# Patient Record
Sex: MALE | Race: ASIAN | NOT HISPANIC OR LATINO | Employment: OTHER
[De-identification: names, ages, dates, MRNs, and addresses within clinical notes are randomized per-mention and may not be internally consistent; named-entity substitution may affect disease eponyms.]

---

## 2017-09-17 ENCOUNTER — HEALTH MAINTENANCE LETTER (OUTPATIENT)
Age: 72
End: 2017-09-17

## 2019-01-09 ENCOUNTER — DOCUMENTATION ONLY (OUTPATIENT)
Dept: OPHTHALMOLOGY | Facility: CLINIC | Age: 74
End: 2019-01-09

## 2020-02-23 ENCOUNTER — HEALTH MAINTENANCE LETTER (OUTPATIENT)
Age: 75
End: 2020-02-23

## 2022-10-05 ENCOUNTER — OFFICE VISIT (OUTPATIENT)
Dept: OPHTHALMOLOGY | Facility: CLINIC | Age: 77
End: 2022-10-05
Payer: MEDICARE

## 2022-10-05 DIAGNOSIS — H43.812 POSTERIOR VITREOUS DETACHMENT OF LEFT EYE: ICD-10-CM

## 2022-10-05 DIAGNOSIS — H35.3230 BILATERAL EXUDATIVE AGE-RELATED MACULAR DEGENERATION, UNSPECIFIED STAGE (H): ICD-10-CM

## 2022-10-05 DIAGNOSIS — H40.053 OCULAR HYPERTENSION, BILATERAL: ICD-10-CM

## 2022-10-05 DIAGNOSIS — Z01.01 ENCOUNTER FOR EXAMINATION OF EYES AND VISION WITH ABNORMAL FINDINGS: ICD-10-CM

## 2022-10-05 DIAGNOSIS — H26.492 POSTERIOR CAPSULAR OPACIFICATION VISUALLY SIGNIFICANT OF LEFT EYE: ICD-10-CM

## 2022-10-05 DIAGNOSIS — H52.4 PRESBYOPIA: ICD-10-CM

## 2022-10-05 DIAGNOSIS — Z96.1 PSEUDOPHAKIA: ICD-10-CM

## 2022-10-05 DIAGNOSIS — E11.3299 DIABETES MELLITUS WITH BACKGROUND RETINOPATHY (H): Primary | ICD-10-CM

## 2022-10-05 PROCEDURE — 66821 AFTER CATARACT LASER SURGERY: CPT | Mod: LT | Performed by: OPHTHALMOLOGY

## 2022-10-05 PROCEDURE — 92015 DETERMINE REFRACTIVE STATE: CPT | Performed by: OPHTHALMOLOGY

## 2022-10-05 PROCEDURE — 92004 COMPRE OPH EXAM NEW PT 1/>: CPT | Mod: 57 | Performed by: OPHTHALMOLOGY

## 2022-10-05 RX ORDER — BUDESONIDE AND FORMOTEROL FUMARATE DIHYDRATE 80; 4.5 UG/1; UG/1
2 AEROSOL RESPIRATORY (INHALATION)
COMMUNITY
Start: 2022-02-14

## 2022-10-05 RX ORDER — TRIAMCINOLONE ACETONIDE 5 MG/G
CREAM TOPICAL
COMMUNITY
Start: 2021-11-04

## 2022-10-05 RX ORDER — FLASH GLUCOSE SCANNING READER
EACH MISCELLANEOUS
COMMUNITY
Start: 2022-04-04

## 2022-10-05 RX ORDER — DAPAGLIFLOZIN 10 MG/1
10 TABLET, FILM COATED ORAL
COMMUNITY
Start: 2022-07-28

## 2022-10-05 RX ORDER — LINAGLIPTIN 5 MG/1
5 TABLET, FILM COATED ORAL
COMMUNITY
Start: 2022-06-01

## 2022-10-05 ASSESSMENT — REFRACTION_MANIFEST
OS_AXIS: 006
OD_ADD: +3.25
OS_ADD: +3.25
OS_CYLINDER: +2.50
OD_AXIS: 015
OD_CYLINDER: +2.50
OD_SPHERE: -2.00
OS_SPHERE: -4.00

## 2022-10-05 ASSESSMENT — CONF VISUAL FIELD
OS_SUPERIOR_NASAL_RESTRICTION: 2
OS_INFERIOR_NASAL_RESTRICTION: 2
OD_SUPERIOR_TEMPORAL_RESTRICTION: 2
OD_INFERIOR_TEMPORAL_RESTRICTION: 2
OD_INFERIOR_NASAL_RESTRICTION: 2
OD_SUPERIOR_NASAL_RESTRICTION: 1
OS_INFERIOR_TEMPORAL_RESTRICTION: 2
OS_SUPERIOR_TEMPORAL_RESTRICTION: 2

## 2022-10-05 ASSESSMENT — CUP TO DISC RATIO
OS_RATIO: 0.5
OD_RATIO: 0.5

## 2022-10-05 ASSESSMENT — REFRACTION_WEARINGRX
OS_CYLINDER: SPHERE
OS_SPHERE: -1.25
OD_SPHERE: -1.00
SPECS_TYPE: BIFOCAL
OD_CYLINDER: SPHERE

## 2022-10-05 ASSESSMENT — EXTERNAL EXAM - RIGHT EYE: OD_EXAM: PROLAPSED FAT PADS: UPPER, LOWER

## 2022-10-05 ASSESSMENT — TONOMETRY
OS_IOP_MMHG: 26
OD_IOP_MMHG: 21
IOP_METHOD: APPLANATION

## 2022-10-05 ASSESSMENT — VISUAL ACUITY
OS_CC: 20/100
OS_PH_CC: 20/80
METHOD: SNELLEN - LINEAR
CORRECTION_TYPE: GLASSES
OD_CC: CF@2

## 2022-10-05 ASSESSMENT — SLIT LAMP EXAM - LIDS
COMMENTS: HIGH CREASE, 1+ PTOSIS
COMMENTS: HIGH CREASE, 1+ PTOSIS

## 2022-10-05 ASSESSMENT — EXTERNAL EXAM - LEFT EYE: OS_EXAM: PROLAPSED FAT PADS: UPPER, LOWER

## 2022-10-05 NOTE — LETTER
10/5/2022         RE: Era Saunders  [address]  [place] [address]        Dear Colleague,    Thank you for referring your patient, Era Saunders, to the [facility]. Please see a copy of my visit note below.     Current Eye Medications:  none     Subjective:  Here for complete eye exam today. Diabetes, more than 7 wife thinks. Seeing Dr. Wesley for injections in left eye every three months for AMD. Just had shot left eye last week.    Hemoglobin A1C   Date Value Ref Range Status   03/09/2015 7.0 (H) 4.3 - 6.0 % Final        Objective:  See Ophthalmology Exam.       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  Visually significant posterior capsule opacity left eye.  Stable background diabetic retinopathy both eyes.      ICD-10-CM    1. Diabetes mellitus with background retinopathy (H)  E11.3299 EYE EXAM (SIMPLE-NONBILLABLE)      2. Bilateral exudative age-related macular degeneration, unspecified stage (H)  H35.3230       3. Pseudophakia, ou; Yag Caps, od  Z96.1       4. Posterior capsular opacification visually significant of left eye  H26.492       5. Ocular hypertension, bilateral  H40.053       6. Posterior vitreous detachment of left eye  H43.812       7. Encounter for examination of eyes and vision with abnormal findings  Z01.01       8. Presbyopia  H52.4 REFRACTIVE STATUS           Plan: Yag Capsulotomy performed without problems left eye under Proparacaine anesthetic.  Well tolerated.  Number of applications: 25  Power of applications: 1.3 mJ  Iopidine given.    Aureliano Oneal M.D.                Again, thank you for allowing me to participate in the care of your patient.        Sincerely,        Aureliano Oneal MD

## 2022-10-05 NOTE — PATIENT INSTRUCTIONS
Your eye may be slightly red, sore, and blurry for a few days.  You may notice some floaters for a few days.  Make a return visit for a intraocular pressure check and refraction in about one - three weeks.    Aureliano Oneal M.D.  [phone]

## 2022-10-05 NOTE — PROGRESS NOTES
Current Eye Medications:  none     Subjective:  Here for complete eye exam today. Diabetes, more than 7 wife thinks. Seeing Dr. Wesley for injections in left eye every three months for AMD. Just had shot left eye last week.    Hemoglobin A1C   Date Value Ref Range Status   03/09/2015 7.0 (H) 4.3 - 6.0 % Final        Objective:  See Ophthalmology Exam.       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  Visually significant posterior capsule opacity left eye.  Stable background diabetic retinopathy both eyes.      ICD-10-CM    1. Diabetes mellitus with background retinopathy (H)  E11.3299 EYE EXAM (SIMPLE-NONBILLABLE)      2. Bilateral exudative age-related macular degeneration, unspecified stage (H)  H35.3230       3. Pseudophakia, ou; Yag Caps, od  Z96.1       4. Posterior capsular opacification visually significant of left eye  H26.492       5. Ocular hypertension, bilateral  H40.053       6. Posterior vitreous detachment of left eye  H43.812       7. Encounter for examination of eyes and vision with abnormal findings  Z01.01       8. Presbyopia  H52.4 REFRACTIVE STATUS           Plan: Yag Capsulotomy performed without problems left eye under Proparacaine anesthetic.  Well tolerated.  Number of applications: 25  Power of applications: 1.3 mJ  Iopidine given.    Aureliano Oneal M.D.

## 2022-10-20 ENCOUNTER — DOCUMENTATION ONLY (OUTPATIENT)
Dept: OPHTHALMOLOGY | Facility: CLINIC | Age: 77
End: 2022-10-20

## 2022-10-26 NOTE — PATIENT INSTRUCTIONS
"Glasses prescription given - optional  May use artificial tears up to four times a day (like Refresh Optive, Systane Balance, TheraTears, or generic artificial tears are ok. Avoid \"get the red out\" drops).   Possible posterior vitreous detachment (sudden onset large floater and/or flashing lights) right eye discussed.   Continue care with Dr. Wesley.  Return visit 2 months for an intraocular pressure check, glaucoma OCT, retinal OCT, and Hall Visual Field.   Aureliano Oneal M.D.  [phone]     Patient Education   Diabetes weakens the blood vessels all over the body, including the eyes. Damage to the blood vessels in the eyes can cause swelling or bleeding into part of the eye (called the retina). This is called diabetic retinopathy (ZAHRA-tin--pu-thee). If not treated, this disease can cause vision loss or blindness.   Symptoms may include blurred or distorted vision, but many people have no symptoms. It's important to see your eye doctor regularly to check for problems.   Early treatment and good control can help protect your vision. Here are the things you can do to help prevent vision loss:      1. Keep your blood sugar levels under tight control.      2. Bring high blood pressure under control.      3. No smoking.      4. Have yearly dilated eye exams.       "

## 2022-10-27 ENCOUNTER — OFFICE VISIT (OUTPATIENT)
Dept: OPHTHALMOLOGY | Facility: CLINIC | Age: 77
End: 2022-10-27
Payer: COMMERCIAL

## 2022-10-27 DIAGNOSIS — Z96.1 PSEUDOPHAKIA: Primary | ICD-10-CM

## 2022-10-27 PROCEDURE — 99024 POSTOP FOLLOW-UP VISIT: CPT | Performed by: OPHTHALMOLOGY

## 2022-10-27 ASSESSMENT — VISUAL ACUITY
CORRECTION_TYPE: GLASSES
OD_CC: LP
OS_CC: 20/50
OS_CC+: -2
METHOD: SNELLEN - LINEAR

## 2022-10-27 ASSESSMENT — EXTERNAL EXAM - RIGHT EYE: OD_EXAM: PROLAPSED FAT PADS: UPPER, LOWER

## 2022-10-27 ASSESSMENT — REFRACTION_MANIFEST
OS_AXIS: 180
OS_ADD: +3.00
OS_SPHERE: -2.50
OS_CYLINDER: +0.75

## 2022-10-27 ASSESSMENT — REFRACTION_WEARINGRX
SPECS_TYPE: BIFOCAL
OS_CYLINDER: SPHERE
OS_SPHERE: -1.25
OD_CYLINDER: SPHERE
OD_SPHERE: -1.00

## 2022-10-27 ASSESSMENT — TONOMETRY
OS_IOP_MMHG: 22
IOP_METHOD: APPLANATION

## 2022-10-27 ASSESSMENT — EXTERNAL EXAM - LEFT EYE: OS_EXAM: PROLAPSED FAT PADS: UPPER, LOWER

## 2022-10-27 ASSESSMENT — SLIT LAMP EXAM - LIDS
COMMENTS: HIGH CREASE, 1+ PTOSIS
COMMENTS: HIGH CREASE, 1+ PTOSIS

## 2022-10-27 NOTE — PROGRESS NOTES
" Current Eye Medications:  None.     Subjective:  Status/Post Yag Capsulotomy left eye:  10-5-22.   Vision has remained about the same since the laser.     Objective:  See Ophthalmology Exam.       Assessment:  Doing well s/p Yag Caps left eye.      Plan:  Glasses prescription given - optional  May use artificial tears up to four times a day (like Refresh Optive, Systane Balance, TheraTears, or generic artificial tears are ok. Avoid \"get the red out\" drops).   Possible posterior vitreous detachment (sudden onset large floater and/or flashing lights) right eye discussed.   Continue care with Dr. Wesley.  Return visit 2 months for an intraocular pressure check, glaucoma OCT, retinal OCT, and Hall Visual Field.   Aureliano Oneal M.D.  [phone]      "

## 2022-10-27 NOTE — LETTER
"    10/27/2022         RE: Era Saunders  [address]  [place] [address]        Dear Colleague,    Thank you for referring your patient, Era Saunders, to the [facility]. Please see a copy of my visit note below.     Current Eye Medications:  None.     Subjective:  Status/Post Yag Capsulotomy left eye:  10-5-22.   Vision has remained about the same since the laser.     Objective:  See Ophthalmology Exam.       Assessment:  Doing well s/p Yag Caps left eye.      Plan:  Glasses prescription given - optional  May use artificial tears up to four times a day (like Refresh Optive, Systane Balance, TheraTears, or generic artificial tears are ok. Avoid \"get the red out\" drops).   Possible posterior vitreous detachment (sudden onset large floater and/or flashing lights) right eye discussed.   Continue care with Dr. Wesley.  Return visit 2 months for an intraocular pressure check, glaucoma OCT, retinal OCT, and Hall Visual Field.   Aureliano Oneal M.D.  [phone]          Again, thank you for allowing me to participate in the care of your patient.        Sincerely,        Aureliano Oneal MD    "

## 2022-11-11 ENCOUNTER — DOCUMENTATION ONLY (OUTPATIENT)
Dept: OPHTHALMOLOGY | Facility: CLINIC | Age: 77
End: 2022-11-11

## 2023-03-20 ENCOUNTER — DOCUMENTATION ONLY (OUTPATIENT)
Dept: OPHTHALMOLOGY | Facility: CLINIC | Age: 78
End: 2023-03-20
Payer: MEDICARE

## 2023-12-14 ENCOUNTER — TRANSFERRED RECORDS (OUTPATIENT)
Dept: HEALTH INFORMATION MANAGEMENT | Facility: CLINIC | Age: 78
End: 2023-12-14

## 2023-12-14 LAB — RETINOPATHY: POSITIVE

## 2024-06-06 ENCOUNTER — TRANSFERRED RECORDS (OUTPATIENT)
Dept: HEALTH INFORMATION MANAGEMENT | Facility: CLINIC | Age: 79
End: 2024-06-06

## 2024-06-06 LAB — RETINOPATHY: POSITIVE

## 2024-09-12 NOTE — PROGRESS NOTES
[facility] New Visit Note    Chief Complaint: memory problems    History of Present Illness:  Mr. Saunders is a 79 year old right-handed male presenting for evaluation of memory problems. He is accompanied to today's visit by his wife Shon (a retired physician, internal medicine) and daughter Phillip who assist in providing the history.    Mr. Saunders was able to provide much history. Wife and daughter reported that he started having some memory problems for a while but his symptoms become more noticeable during COVID. They described as increased confusion and recalling his friends or some of his family members. He was diagnosed of dementia/Alzheimer's disease by [facility]. He had CT head done in 2021 which reported b/l hippocampal volume loss and SVIC. He has been taking mementine at 10mg BID and sertraline 50mg daily.     Family reports some functional decline in past 5-6 months or so, mostly with nighttime snacking. He repetitively asks for food and sneaks to the kitchen to eat snacks at night when everyone goes to bed. His blood sugar was found high in the morning. He also needs a lot of prompting to do his own ADLs. He does not like to shower. They now have a PCA in house every day spending 3-4 hours/day with him when they go out for walk or have lunch together.     Denies any symptoms of delusions/paranoia, visual/auditory hallucinations. Sleep is good with average 7-8 hours/night and sometimes he takes naps during the day. There are some issues with his balance and he had one fall yesterday and also tripped and fell 3-4 months ago.    Wife has been sorting his medications all these years. However in the past 9 months, they noticed that he took wrong pills from his pill box like repetitively taking his medications with one incident when his BS was low. Family is interested in understanding the disease trajectory and how to better manage his behavior symptom like excessive eating and  snacking and how to help him better.    ROS:  General: no weight loss or fever  Cardiac: no chest pain or palpitations  Pulmonary: no SOB or cough  GI: no abdominal pain, nausea, vomiting, or constipation  : no urinary urgency, pain or incontinence  Musculoskeletal: no joint pain or stiffness  Skin: no rashes or easy bruising  Neurological: as above  Pain Evaluation: no reported pain today.    Patient Active Problem List   Diagnosis    Hyperlipidemia LDL goal <100    TYPE 2 DIABETES, HBA1C GOAL < 7%    Left inguinal hernia    Diabetes mellitus with background retinopathy (H)    Tendonitis, Achilles, right    Pseudophakia, Yag Caps, ou    Hypertension goal BP (blood pressure) < 140/90    AD (atopic dermatitis)    Hyperkeratosis    Lichen simplex chronicus    Essential hypertension, benign    Hypertrophy of prostate with urinary obstruction    Cataract, mild-mod, os    Macular degeneration, wet, active antiVEGF, ou    Ocular hypertension, bilateral       Past Medical History:   Diagnosis Date    Diabetic retinopathy (H)     Eczema     Essential hypertension, benign     Glaucoma     Hyperlipidemia LDL goal <100     Macular degeneration     Nonsenile cataract     Type II or unspecified type diabetes mellitus without mention of complication, not stated as uncontrolled        Past Surgical History:   Procedure Laterality Date    CATARACT IOL, RT/LT      LASER YAG CAPSULOTOMY  11/01/2013    right eye    LASER YAG CAPSULOTOMY Left 10/05/2022    PHACOEMULSIFICATION WITH STANDARD INTRAOCULAR LENS IMPLANT  08/01/2011    right eye    PHACOEMULSIFICATION WITH STANDARD INTRAOCULAR LENS IMPLANT Left     VITRECTOMY PARSPLANA  06/01/2009    right eye - submacular hemorrhage evacuation    ZZC STOMACH SURGERY PROCEDURE UNLISTED         Current Outpatient Medications   Medication Sig Dispense Refill    ASPIRIN LOW DOSE 81 MG EC tablet TAKE 1 TABLET (81 MG) BY MOUTH DAILY 100 tablet 0    budesonide-formoterol (SYMBICORT) 80-4.5  MCG/ACT Inhaler Inhale 2 puffs into the lungs      cholecalciferol 50 MCG (2000 UT) tablet Take 50 mcg by mouth      Continuous Blood Gluc  (FREESTYLE TUTU 14 DAY READER) NOE CHANGE SENSOR EVERY 14 DAYS      dapagliflozin (FARXIGA) 10 MG TABS tablet Take 10 mg by mouth      docusate sodium 100 MG tablet Take 100 mg by mouth 2 times daily as needed for constipation 60 tablet 1    FISH OIL 1000 MG OR CAPS None Entered      glipiZIDE (GLUCOTROL) 10 MG tablet TAKE 1 TABLET (10 MG) BY MOUTH 2 TIMES DAILY (BEFORE MEALS) FOR DIABETES. 180 tablet 0    glucose blood VI test strips (ACCU-CHEK COMPACT STRIPS) strip 1 strip by Strip route daily 100 each 99    linagliptin (TRADJENTA) 5 MG TABS tablet Take 5 mg by mouth      metFORMIN (GLUCOPHAGE) 1000 MG tablet Take 1,000 mg by mouth      metFORMIN (GLUCOPHAGE) 1000 MG tablet Take 1 tablet (1,000 mg) by mouth 2 times daily (with meals) for diabetes. 180 tablet 2    ORDER FOR DME Glucometer covered by insurance (Breeze 2). 1 each 0    sertraline (ZOLOFT) 50 MG tablet Take 50 mg by mouth      Skin Protectants, Misc. (EUCERIN) cream Apply  topically as needed for dry skin. 454 g prn    SOFTCLIX LANCETS MISC 1 Device by Device route daily 100 each 99    triamcinolone (ARISTOCORT HP) 0.5 % external cream APPLY 1 APPLICATION TO SKIN TWICE A DAY.      Urea (CARMOL 40) 40 % CREA To areas of thickened skin of the feet twice daily. 85.05 g 3        Allergies   Allergen Reactions    Sulfa Antibiotics Hives, Itching and Rash     Other reaction(s): *Unknown  Does not remember reaction  Does not remember reaction         Family History   Problem Relation Age of Onset    Cataracts Mother     Cataracts Father     Heart Disease Brother    Mother with dementia (at age of 70s); brother with LOC/Alzheimer's disease at age of 50s)        Social History     Socioeconomic History    Marital status:      Spouse name: Not on file    Number of children: Not on file    Years of education:  Not on file    Highest education level: Not on file   Occupational History     Employer: RETIRED   Tobacco Use    Smoking status: Never    Smokeless tobacco: Never    Tobacco comments:     9/24/08   Substance and Sexual Activity    Alcohol use: No    Drug use: No    Sexual activity: Yes     Partners: Female     Birth control/protection: Surgical   Other Topics Concern    Parent/sibling w/ CABG, MI or angioplasty before 65F 55M? No     Service No    Blood Transfusions No    Caffeine Concern No    Occupational Exposure No    Hobby Hazards No    Sleep Concern No    Stress Concern No    Weight Concern No    Special Diet No    Back Care No    Exercise No    Bike Helmet No    Seat Belt Yes    Self-Exams No   Social History Narrative    Not on file     Social Determinants of Health     Financial Resource Strain: Not on file   Food Insecurity: Not on file   Transportation Needs: Not on file   Physical Activity: Not on file   Stress: Not on file   Social Connections: Not on file   Interpersonal Safety: Not on file   Housing Stability: Not on file     General Physical examination:  /67 (BP Location: Right arm, Patient Position: Sitting, Cuff Size: Adult Regular)   Pulse 93   Temp 97.7  F (36.5  C) (Temporal)   SpO2 97%      Neurological examination:  Mental status: Mr. Saunders  is awake and alert. He is attentive with no apraxia noted. Follows cross-body commands.  Cranial nerves:  Visual fields are full to confrontation with no visual extinction. Extraocular movements are intact. Smooth pursuit is intact. Saccades are normal in initiation, velocity and amplitude both vertically and horizontally. Facial strength is full bilaterally.    Motor examination: Bulk is normal throughout. Axial and upper and lower extremity tone is normal. No pronator drift is noted. Strength is 5/5 and symmetric throughout. No bradykinesia or cogwheel rigidity.  Sensation: LT intact b/l throughout.  Reflexes: 1+  throughout.  Coordination: Finger-nose-finger is normal with no dysmetria bilaterally. Negative Romberg's sign.  Gait: He was able to stand from sitting position without arm support. Negative Retropulsion.    MoCA /30 - deferred with dementia stage  /5 visuospatial/Executive  /3 naming  /6 attention  /3 language  /2 abstract  /5 delayed recall  /6 orientation    Neuropsychological evaluation:  N/A.    Labs:  Lab Results   Component Value Date    WBC 9.1 08/31/2012    RBC 4.66 08/31/2012    HGB 13.6 08/31/2012    HCT 40.7 08/31/2012    MCV 87 08/31/2012    MCH 29.1 08/31/2012    MCHC 33.3 08/31/2012    RDW 12.9 08/31/2012     08/31/2012     03/09/2015    POTASSIUM 3.7 03/09/2015    CHLORIDE 99 03/09/2015    CO2 27 03/09/2015    ANIONGAP 12 03/09/2015     (H) 03/09/2015    BUN 19 03/09/2015    CR 1.13 03/09/2015    FADI 9.2 03/09/2015    TSH 3.08 08/19/2014      Imaging:  Results for orders placed or performed in visit on 03/09/15   XR Cervical Spine 2/3 Views    Narrative    XR CERVICAL SPINE 2/3 VWS 3/9/2015 12:39 PM    HISTORY: Pain.    COMPARISON: None.      Impression    IMPRESSION: No evidence of acute fracture. There is straightening of  the cervical spine with moderate to severe degenerative changes of  C5-C6. The prevertebral soft tissues are unremarkable.    ZUNILDA PATTERSON MD      CT Head w/o Contrast  3/28/2021  Impression    IMPRESSION:  1.  No acute abnormality.  2.  Involutional changes, most pronounced involving the anterior temporal lobes, including the hippocampal formations.  3.  Nonspecific supratentorial white matter low-attenuation, most likely representing foci of chronic microvascular infarction.     3/30/2022  FINDINGS: Fairly advanced cerebral volume loss, as before. Hippocampal atrophy on both sides, right more than left. No hydrocephalus.     No intracranial hemorrhage. No mass or evidence of acute ischemic stroke.     Mild degree of vague low density in the deep cerebral  white matter, typical for age, unchanged.     No extra-axial fluid collection.     No skull fracture.     Impression:  Mr. Saunders is a 79 year old right-handed male who presents with memory problems for 3+ years. Now with increased confusion and prompting for ADLs. Some behavior symptoms described as excessive eating and snacking. Exam today did not observe any focal or lateralizing symptoms or any evidence of Parkinsonian features. CT head results reviewed. Clinical presentation is suggestive of major neurocognitive disorder, moderate dementia stage with possible Alzheimer's disease as primary etiology. 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    Recommendations:  1. Continue on mementine at 10mg BID.  2. Continue on zoloft at 50mg daily.  3. A trial of low dose quetiapine at 12.5mg at bedtime to see if it can control his nighttime snacking behavior. Watch for excessive sedation and other side effects.    Follow-up: Return in about 1 month with telephone visit to follow up on efficacy and any side effects.    I spent >60 minutes total today for this visit, which includes face-to-face with the patient, reviewing the chart (CT images, lab reports, clinical notes, medications), ordering medication, counseling, and documentation.

## 2024-09-13 ENCOUNTER — OFFICE VISIT (OUTPATIENT)
Dept: NEUROLOGY | Facility: CLINIC | Age: 79
End: 2024-09-13
Payer: COMMERCIAL

## 2024-09-13 VITALS
HEART RATE: 93 BPM | TEMPERATURE: 97.7 F | SYSTOLIC BLOOD PRESSURE: 109 MMHG | DIASTOLIC BLOOD PRESSURE: 67 MMHG | OXYGEN SATURATION: 97 %

## 2024-09-13 DIAGNOSIS — F03.A0 MILD DEMENTIA WITHOUT BEHAVIORAL DISTURBANCE, PSYCHOTIC DISTURBANCE, MOOD DISTURBANCE, OR ANXIETY, UNSPECIFIED DEMENTIA TYPE (H): Primary | ICD-10-CM

## 2024-09-13 RX ORDER — MEMANTINE HYDROCHLORIDE 10 MG/1
10 TABLET ORAL 2 TIMES DAILY
COMMUNITY

## 2024-09-13 RX ORDER — QUETIAPINE FUMARATE 25 MG/1
12.5 TABLET, FILM COATED ORAL AT BEDTIME
Qty: 45 TABLET | Refills: 0 | Status: SHIPPED | OUTPATIENT
Start: 2024-09-13 | End: 2024-12-12

## 2024-09-13 NOTE — LETTER
9/13/2024       RE: Era Saunders  [address] [facility] [address]  [place] [address]     Dear Colleague,    Thank you for referring your patient, Era Saunders, to the  [facility] at [facility]. Please see a copy of my visit note below.    [facility] New Visit Note    Chief Complaint: memory problems    History of Present Illness:  Mr. Saunders is a 79 year old right-handed male presenting for evaluation of memory problems. He is accompanied to today's visit by his wife Shon (a retired physician, internal medicine) and daughter Phillip who assist in providing the history.    Mr. Saunders was able to provide much history. Wife and daughter reported that he started having some memory problems for a while but his symptoms become more noticeable during COVID. They described as increased confusion and recalling his friends or some of his family members. He was diagnosed of dementia/Alzheimer's disease by [facility]. He had CT head done in 2021 which reported b/l hippocampal volume loss and SVIC. He has been taking mementine at 10mg BID and sertraline 50mg daily.     Family reports some functional decline in past 5-6 months or so, mostly with nighttime snacking. He repetitively asks for food and sneaks to the kitchen to eat snacks at night when everyone goes to bed. His blood sugar was found high in the morning. He also needs a lot of prompting to do his own ADLs. He does not like to shower. They now have a PCA in house every day spending 3-4 hours/day with him when they go out for walk or have lunch together.     Denies any symptoms of delusions/paranoia, visual/auditory hallucinations. Sleep is good with average 7-8 hours/night and sometimes he takes naps during the day. There are some issues with his balance and he had one fall yesterday and also tripped and fell 3-4 months ago.    Wife has been sorting his medications all these  years. However in the past 9 months, they noticed that he took wrong pills from his pill box like repetitively taking his medications with one incident when his BS was low. Family is interested in understanding the disease trajectory and how to better manage his behavior symptom like excessive eating and snacking and how to help him better.    ROS:  General: no weight loss or fever  Cardiac: no chest pain or palpitations  Pulmonary: no SOB or cough  GI: no abdominal pain, nausea, vomiting, or constipation  : no urinary urgency, pain or incontinence  Musculoskeletal: no joint pain or stiffness  Skin: no rashes or easy bruising  Neurological: as above  Pain Evaluation: no reported pain today.    Patient Active Problem List   Diagnosis     Hyperlipidemia LDL goal <100     TYPE 2 DIABETES, HBA1C GOAL < 7%     Left inguinal hernia     Diabetes mellitus with background retinopathy (H)     Tendonitis, Achilles, right     Pseudophakia, Yag Caps, ou     Hypertension goal BP (blood pressure) < 140/90     AD (atopic dermatitis)     Hyperkeratosis     Lichen simplex chronicus     Essential hypertension, benign     Hypertrophy of prostate with urinary obstruction     Cataract, mild-mod, os     Macular degeneration, wet, active antiVEGF, ou     Ocular hypertension, bilateral       Past Medical History:   Diagnosis Date     Diabetic retinopathy (H)      Eczema      Essential hypertension, benign      Glaucoma      Hyperlipidemia LDL goal <100      Macular degeneration      Nonsenile cataract      Type II or unspecified type diabetes mellitus without mention of complication, not stated as uncontrolled        Past Surgical History:   Procedure Laterality Date     CATARACT IOL, RT/LT       LASER YAG CAPSULOTOMY  11/01/2013    right eye     LASER YAG CAPSULOTOMY Left 10/05/2022     PHACOEMULSIFICATION WITH STANDARD INTRAOCULAR LENS IMPLANT  08/01/2011    right eye     PHACOEMULSIFICATION WITH STANDARD INTRAOCULAR LENS IMPLANT Left       VITRECTOMY PARSPLANA  06/01/2009    right eye - submacular hemorrhage evacuation     ZZC STOMACH SURGERY PROCEDURE UNLISTED         Current Outpatient Medications   Medication Sig Dispense Refill     ASPIRIN LOW DOSE 81 MG EC tablet TAKE 1 TABLET (81 MG) BY MOUTH DAILY 100 tablet 0     budesonide-formoterol (SYMBICORT) 80-4.5 MCG/ACT Inhaler Inhale 2 puffs into the lungs       cholecalciferol 50 MCG (2000 UT) tablet Take 50 mcg by mouth       Continuous Blood Gluc  (FREESTYLE TUTU 14 DAY READER) NOE CHANGE SENSOR EVERY 14 DAYS       dapagliflozin (FARXIGA) 10 MG TABS tablet Take 10 mg by mouth       docusate sodium 100 MG tablet Take 100 mg by mouth 2 times daily as needed for constipation 60 tablet 1     FISH OIL 1000 MG OR CAPS None Entered       glipiZIDE (GLUCOTROL) 10 MG tablet TAKE 1 TABLET (10 MG) BY MOUTH 2 TIMES DAILY (BEFORE MEALS) FOR DIABETES. 180 tablet 0     glucose blood VI test strips (ACCU-CHEK COMPACT STRIPS) strip 1 strip by Strip route daily 100 each 99     linagliptin (TRADJENTA) 5 MG TABS tablet Take 5 mg by mouth       metFORMIN (GLUCOPHAGE) 1000 MG tablet Take 1,000 mg by mouth       metFORMIN (GLUCOPHAGE) 1000 MG tablet Take 1 tablet (1,000 mg) by mouth 2 times daily (with meals) for diabetes. 180 tablet 2     ORDER FOR DME Glucometer covered by insurance (Breeze 2). 1 each 0     sertraline (ZOLOFT) 50 MG tablet Take 50 mg by mouth       Skin Protectants, Misc. (EUCERIN) cream Apply  topically as needed for dry skin. 454 g prn     SOFTCLIX LANCETS MISC 1 Device by Device route daily 100 each 99     triamcinolone (ARISTOCORT HP) 0.5 % external cream APPLY 1 APPLICATION TO SKIN TWICE A DAY.       Urea (CARMOL 40) 40 % CREA To areas of thickened skin of the feet twice daily. 85.05 g 3        Allergies   Allergen Reactions     Sulfa Antibiotics Hives, Itching and Rash     Other reaction(s): *Unknown  Does not remember reaction  Does not remember reaction         Family History    Problem Relation Age of Onset     Cataracts Mother      Cataracts Father      Heart Disease Brother    Mother with dementia (at age of 70s); brother with LOC/Alzheimer's disease at age of 50s)        Social History     Socioeconomic History     Marital status:      Spouse name: Not on file     Number of children: Not on file     Years of education: Not on file     Highest education level: Not on file   Occupational History     Employer: RETIRED   Tobacco Use     Smoking status: Never     Smokeless tobacco: Never     Tobacco comments:     9/24/08   Substance and Sexual Activity     Alcohol use: No     Drug use: No     Sexual activity: Yes     Partners: Female     Birth control/protection: Surgical   Other Topics Concern     Parent/sibling w/ CABG, MI or angioplasty before 65F 55M? No      Service No     Blood Transfusions No     Caffeine Concern No     Occupational Exposure No     Hobby Hazards No     Sleep Concern No     Stress Concern No     Weight Concern No     Special Diet No     Back Care No     Exercise No     Bike Helmet No     Seat Belt Yes     Self-Exams No   Social History Narrative     Not on file     Social Determinants of Health     Financial Resource Strain: Not on file   Food Insecurity: Not on file   Transportation Needs: Not on file   Physical Activity: Not on file   Stress: Not on file   Social Connections: Not on file   Interpersonal Safety: Not on file   Housing Stability: Not on file     General Physical examination:  /67 (BP Location: Right arm, Patient Position: Sitting, Cuff Size: Adult Regular)   Pulse 93   Temp 97.7  F (36.5  C) (Temporal)   SpO2 97%      Neurological examination:  Mental status: Mr. Saunders  is awake and alert. He is attentive with no apraxia noted. Follows cross-body commands.  Cranial nerves:  Visual fields are full to confrontation with no visual extinction. Extraocular movements are intact. Smooth pursuit is intact. Saccades are normal in  initiation, velocity and amplitude both vertically and horizontally. Facial strength is full bilaterally.    Motor examination: Bulk is normal throughout. Axial and upper and lower extremity tone is normal. No pronator drift is noted. Strength is 5/5 and symmetric throughout. No bradykinesia or cogwheel rigidity.  Sensation: LT intact b/l throughout.  Reflexes: 1+ throughout.  Coordination: Finger-nose-finger is normal with no dysmetria bilaterally. Negative Romberg's sign.  Gait: He was able to stand from sitting position without arm support. Negative Retropulsion.    MoCA /30 - deferred with dementia stage  /5 visuospatial/Executive  /3 naming  /6 attention  /3 language  /2 abstract  /5 delayed recall  /6 orientation    Neuropsychological evaluation:  N/A.    Labs:  Lab Results   Component Value Date    WBC 9.1 08/31/2012    RBC 4.66 08/31/2012    HGB 13.6 08/31/2012    HCT 40.7 08/31/2012    MCV 87 08/31/2012    MCH 29.1 08/31/2012    MCHC 33.3 08/31/2012    RDW 12.9 08/31/2012     08/31/2012     03/09/2015    POTASSIUM 3.7 03/09/2015    CHLORIDE 99 03/09/2015    CO2 27 03/09/2015    ANIONGAP 12 03/09/2015     (H) 03/09/2015    BUN 19 03/09/2015    CR 1.13 03/09/2015    FADI 9.2 03/09/2015    TSH 3.08 08/19/2014      Imaging:  Results for orders placed or performed in visit on 03/09/15   XR Cervical Spine 2/3 Views    Narrative    XR CERVICAL SPINE 2/3 VWS 3/9/2015 12:39 PM    HISTORY: Pain.    COMPARISON: None.      Impression    IMPRESSION: No evidence of acute fracture. There is straightening of  the cervical spine with moderate to severe degenerative changes of  C5-C6. The prevertebral soft tissues are unremarkable.    ZUNILDA PATTERSON MD      CT Head w/o Contrast  3/28/2021  Impression    IMPRESSION:  1.  No acute abnormality.  2.  Involutional changes, most pronounced involving the anterior temporal lobes, including the hippocampal formations.  3.  Nonspecific supratentorial white matter  low-attenuation, most likely representing foci of chronic microvascular infarction.     3/30/2022  FINDINGS: Fairly advanced cerebral volume loss, as before. Hippocampal atrophy on both sides, right more than left. No hydrocephalus.     No intracranial hemorrhage. No mass or evidence of acute ischemic stroke.     Mild degree of vague low density in the deep cerebral white matter, typical for age, unchanged.     No extra-axial fluid collection.     No skull fracture.     Impression:  Mr. Saunders is a 79 year old right-handed male who presents with memory problems for 3+ years. Now with increased confusion and prompting for ADLs. Some behavior symptoms described as excessive eating and snacking. Exam today did not observe any focal or lateralizing symptoms or any evidence of Parkinsonian features. CT head results reviewed. Clinical presentation is suggestive of major neurocognitive disorder, moderate dementia stage with possible Alzheimer's disease as primary etiology. 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    Recommendations:  1. Continue on mementine at 10mg BID.  2. Continue on zoloft at 50mg daily.  3. A trial of low dose quetiapine at 12.5mg at bedtime to see if it can control his nighttime snacking behavior. Watch for excessive sedation and other side effects.    Follow-up: Return in about 1 month with telephone visit to follow up on efficacy and any side effects.    I spent >60 minutes total today for this visit, which includes face-to-face with the patient, reviewing the chart (CT images, lab reports, clinical notes, medications), ordering medication, counseling, and documentation.         Again, thank you for allowing me to participate in the care of your patient.      Sincerely,    Nena Rodrigez MD

## 2024-12-09 DIAGNOSIS — F03.A0 MILD DEMENTIA WITHOUT BEHAVIORAL DISTURBANCE, PSYCHOTIC DISTURBANCE, MOOD DISTURBANCE, OR ANXIETY, UNSPECIFIED DEMENTIA TYPE (H): ICD-10-CM

## 2024-12-09 RX ORDER — QUETIAPINE FUMARATE 25 MG/1
12.5 TABLET, FILM COATED ORAL AT BEDTIME
Qty: 45 TABLET | Refills: 0 | Status: SHIPPED | OUTPATIENT
Start: 2024-12-09 | End: 2025-03-09

## 2025-01-16 ENCOUNTER — TRANSFERRED RECORDS (OUTPATIENT)
Dept: HEALTH INFORMATION MANAGEMENT | Facility: CLINIC | Age: 80
End: 2025-01-16

## 2025-01-16 LAB — RETINOPATHY: POSITIVE

## 2025-07-17 ENCOUNTER — LAB REQUISITION (OUTPATIENT)
Dept: LAB | Facility: CLINIC | Age: 80
End: 2025-07-17
Payer: COMMERCIAL

## 2025-07-17 DIAGNOSIS — F39 UNSPECIFIED MOOD (AFFECTIVE) DISORDER: ICD-10-CM

## 2025-07-17 DIAGNOSIS — E11.65 TYPE 2 DIABETES MELLITUS WITH HYPERGLYCEMIA (H): ICD-10-CM

## 2025-07-17 DIAGNOSIS — G30.1 ALZHEIMER'S DISEASE WITH LATE ONSET (CODE) (H): ICD-10-CM

## 2025-07-17 DIAGNOSIS — R26.89 OTHER ABNORMALITIES OF GAIT AND MOBILITY: ICD-10-CM

## 2025-07-17 DIAGNOSIS — F02.C3: ICD-10-CM

## 2025-07-22 LAB
ALBUMIN SERPL BCG-MCNC: 4.3 G/DL (ref 3.5–5.2)
ALP SERPL-CCNC: 73 U/L (ref 40–150)
ALT SERPL W P-5'-P-CCNC: 14 U/L (ref 0–70)
ANION GAP SERPL CALCULATED.3IONS-SCNC: 13 MMOL/L (ref 7–15)
AST SERPL W P-5'-P-CCNC: 19 U/L (ref 0–45)
BILIRUB SERPL-MCNC: 0.3 MG/DL
BUN SERPL-MCNC: 21.2 MG/DL (ref 8–23)
CALCIUM SERPL-MCNC: 9.8 MG/DL (ref 8.8–10.4)
CHLORIDE SERPL-SCNC: 99 MMOL/L (ref 98–107)
CREAT SERPL-MCNC: 1.15 MG/DL (ref 0.67–1.17)
EGFRCR SERPLBLD CKD-EPI 2021: 64 ML/MIN/1.73M2
EST. AVERAGE GLUCOSE BLD GHB EST-MCNC: 200 MG/DL
GLUCOSE SERPL-MCNC: 210 MG/DL (ref 70–99)
HBA1C MFR BLD: 8.6 %
HCO3 SERPL-SCNC: 28 MMOL/L (ref 22–29)
POTASSIUM SERPL-SCNC: 4.4 MMOL/L (ref 3.4–5.3)
PROT SERPL-MCNC: 7.6 G/DL (ref 6.4–8.3)
SODIUM SERPL-SCNC: 140 MMOL/L (ref 135–145)